# Patient Record
Sex: MALE | Race: WHITE
[De-identification: names, ages, dates, MRNs, and addresses within clinical notes are randomized per-mention and may not be internally consistent; named-entity substitution may affect disease eponyms.]

---

## 2020-07-20 ENCOUNTER — APPOINTMENT (OUTPATIENT)
Dept: CARDIOLOGY | Facility: CLINIC | Age: 65
End: 2020-07-20

## 2021-02-01 ENCOUNTER — RX RENEWAL (OUTPATIENT)
Age: 66
End: 2021-02-01

## 2021-02-01 DIAGNOSIS — I10 ESSENTIAL (PRIMARY) HYPERTENSION: ICD-10-CM

## 2021-02-01 DIAGNOSIS — F32.9 MAJOR DEPRESSIVE DISORDER, SINGLE EPISODE, UNSPECIFIED: ICD-10-CM

## 2021-02-01 PROBLEM — Z00.00 ENCOUNTER FOR PREVENTIVE HEALTH EXAMINATION: Status: ACTIVE | Noted: 2021-02-01

## 2021-03-08 DIAGNOSIS — J32.9 CHRONIC SINUSITIS, UNSPECIFIED: ICD-10-CM

## 2021-05-28 ENCOUNTER — APPOINTMENT (OUTPATIENT)
Dept: UROLOGY | Facility: CLINIC | Age: 66
End: 2021-05-28
Payer: COMMERCIAL

## 2021-05-28 DIAGNOSIS — Z12.5 ENCOUNTER FOR SCREENING FOR MALIGNANT NEOPLASM OF PROSTATE: ICD-10-CM

## 2021-05-28 PROCEDURE — 99204 OFFICE O/P NEW MOD 45 MIN: CPT

## 2021-05-28 PROCEDURE — 99072 ADDL SUPL MATRL&STAF TM PHE: CPT

## 2021-05-28 NOTE — HISTORY OF PRESENT ILLNESS
[FreeTextEntry1] : This is a 65 year male who has nocturia every 4 hours\par takes over the counter -- for 6 months didn’t see much difference\par \par was told has enlarged prostate 2 years ago during a back MRI\par \par Some ED -- thinks after started lexapor\par no histotyr of heart attack\par able to walk 2 miles without chest pain

## 2021-07-21 ENCOUNTER — APPOINTMENT (OUTPATIENT)
Dept: UROLOGY | Facility: CLINIC | Age: 66
End: 2021-07-21
Payer: COMMERCIAL

## 2021-07-21 VITALS — WEIGHT: 210 LBS | HEIGHT: 70 IN | BODY MASS INDEX: 30.06 KG/M2

## 2021-07-21 DIAGNOSIS — N52.01 ERECTILE DYSFUNCTION DUE TO ARTERIAL INSUFFICIENCY: ICD-10-CM

## 2021-07-21 DIAGNOSIS — N40.1 BENIGN PROSTATIC HYPERPLASIA WITH LOWER URINARY TRACT SYMPMS: ICD-10-CM

## 2021-07-21 DIAGNOSIS — N13.8 BENIGN PROSTATIC HYPERPLASIA WITH LOWER URINARY TRACT SYMPMS: ICD-10-CM

## 2021-07-21 PROCEDURE — 99213 OFFICE O/P EST LOW 20 MIN: CPT

## 2021-07-21 PROCEDURE — 99072 ADDL SUPL MATRL&STAF TM PHE: CPT

## 2021-07-21 NOTE — ASSESSMENT
[FreeTextEntry1] : This is a 65 year male who has nocturia every 4 hours\par takes over the counter -- for 6 months didn’t see much difference\par has been taking flomax since last visit\par \par was told has enlarged prostate 2 years ago during a back MRI\par \par Some ED -- thinks after started lexapor\par no histotyr of heart attack\par able to walk 2 miles without chest pain. \par wishes to not treat ED at this time+\par \par  june 2021\par Culture - Urine; no grwoth \par Urinalysis w/Reflex- negative\par US Urinary Bladder; prostate 93g with median lobe hypertrophy protruding into bladder, pvr of 235 first void then 188ml\par PSA Profile - Total = 6, % free = 20\par \par July 2021\par uroflow/pvr\par voided 101ml, peak flow of 8.8ml/s and pvr of 161ml

## 2021-07-21 NOTE — HISTORY OF PRESENT ILLNESS
[FreeTextEntry1] : This is a 65 year male who has nocturia every 4 hours\par takes over the counter -- for 6 months didn’t see much difference\par has been taking flomax since last visit\par \par was told has enlarged prostate 2 years ago during a back MRI\par \par Some ED -- thinks after started lexapor\par no histotyr of heart attack\par able to walk 2 miles without chest pain. \par \par  june 2021\par Culture - Urine; no grwoth \par Urinalysis w/Reflex- negative\par US Urinary Bladder; prostate 93g with median lobe hypertrophy protruding into bladder, pvr of 235 first void then 188ml\par PSA Profile - Total = 6, % free = 20\par \par July 2021\par uroflow/pvr\par voided 101ml, peak flow of 8.8ml/s and pvr of 161ml

## 2021-09-14 ENCOUNTER — RESULT REVIEW (OUTPATIENT)
Age: 66
End: 2021-09-14

## 2021-09-14 ENCOUNTER — OUTPATIENT (OUTPATIENT)
Dept: OUTPATIENT SERVICES | Facility: HOSPITAL | Age: 66
LOS: 1 days | Discharge: HOME | End: 2021-09-14
Payer: COMMERCIAL

## 2021-09-14 DIAGNOSIS — R97.20 ELEVATED PROSTATE SPECIFIC ANTIGEN [PSA]: ICD-10-CM

## 2021-09-14 PROCEDURE — 72197 MRI PELVIS W/O & W/DYE: CPT | Mod: 26

## 2021-09-15 ENCOUNTER — APPOINTMENT (OUTPATIENT)
Dept: UROLOGY | Facility: CLINIC | Age: 66
End: 2021-09-15
Payer: COMMERCIAL

## 2021-09-15 VITALS — TEMPERATURE: 98 F | HEIGHT: 70 IN | WEIGHT: 210 LBS | BODY MASS INDEX: 30.06 KG/M2

## 2021-09-15 DIAGNOSIS — R35.1 NOCTURIA: ICD-10-CM

## 2021-09-15 DIAGNOSIS — R97.20 ELEVATED PROSTATE, SPECIFIC ANTIGEN [PSA]: ICD-10-CM

## 2021-09-15 PROCEDURE — 99214 OFFICE O/P EST MOD 30 MIN: CPT

## 2021-09-15 NOTE — HISTORY OF PRESENT ILLNESS
[FreeTextEntry1] : This is a 65 year male who has nocturia every 4 hours\par takes over the counter -- for 6 months didn’t see much difference\par has been taking flomax since last visit\par finasteride added last visit\par \par was told has enlarged prostate 2 years ago during a back MRI\par \par Some ED -- thinks after started lexapor\par no histotyr of heart attack\par able to walk 2 miles without chest pain. \par wishes to not treat ED at this time+\par \par  june 2021\par Culture - Urine; no grwoth \par Urinalysis w/Reflex- negative\par US Urinary Bladder; prostate 93g with median lobe hypertrophy protruding into bladder, pvr of 235 first void then 188ml\par PSA Profile - Total = 6, % free = 20\par \par July 2021\par uroflow/pvr\par voided 101ml, peak flow of 8.8ml/s and pvr of 161ml. \par \par  \par Sept 2021\par MR Prostate w/wo IV Cont; \par pirad 4 lesion in the base of the mid right gland -- multiple nonspecific subcm pelvic nodes

## 2021-09-15 NOTE — ASSESSMENT
[FreeTextEntry1] : This is a 65 year male who has nocturia every 4 hours\par takes over the counter -- for 6 months didn’t see much difference\par has been taking flomax since last visit\par finasteride added last visit\par \par was told has enlarged prostate 2 years ago during a back MRI\par \par Some ED -- thinks after started lexapor\par no histotyr of heart attack\par able to walk 2 miles without chest pain. \par wishes to not treat ED at this time+\par \par  june 2021\par Culture - Urine; no grwoth \par Urinalysis w/Reflex- negative\par US Urinary Bladder; prostate 93g with median lobe hypertrophy protruding into bladder, pvr of 235 first void then 188ml\par PSA Profile - Total = 6, % free = 20\par \par July 2021\par uroflow/pvr\par voided 101ml, peak flow of 8.8ml/s and pvr of 161ml. \par \par  \par Sept 2021\par MR Prostate w/wo IV Cont; \par pirad 4 lesion in the base of the mid right gland -- multiple nonspecific subcm pelvic nodesv

## 2021-10-07 ENCOUNTER — NON-APPOINTMENT (OUTPATIENT)
Age: 66
End: 2021-10-07

## 2022-02-25 ENCOUNTER — RX RENEWAL (OUTPATIENT)
Age: 67
End: 2022-02-25

## 2022-04-11 ENCOUNTER — RX RENEWAL (OUTPATIENT)
Age: 67
End: 2022-04-11

## 2023-03-30 ENCOUNTER — RX RENEWAL (OUTPATIENT)
Age: 68
End: 2023-03-30

## 2023-11-22 ENCOUNTER — RX RENEWAL (OUTPATIENT)
Age: 68
End: 2023-11-22

## 2023-11-22 RX ORDER — ESCITALOPRAM OXALATE 10 MG/1
10 TABLET ORAL
Qty: 90 | Refills: 3 | Status: ACTIVE | COMMUNITY
Start: 2021-12-07 | End: 1900-01-01

## 2023-11-22 RX ORDER — LISINOPRIL 10 MG/1
10 TABLET ORAL
Qty: 90 | Refills: 3 | Status: ACTIVE | COMMUNITY
Start: 2021-12-07 | End: 1900-01-01

## 2024-04-04 ENCOUNTER — NON-APPOINTMENT (OUTPATIENT)
Age: 69
End: 2024-04-04

## 2024-04-05 ENCOUNTER — APPOINTMENT (OUTPATIENT)
Dept: NEUROLOGY | Facility: CLINIC | Age: 69
End: 2024-04-05
Payer: COMMERCIAL

## 2024-04-05 VITALS
WEIGHT: 220 LBS | SYSTOLIC BLOOD PRESSURE: 179 MMHG | HEIGHT: 70 IN | DIASTOLIC BLOOD PRESSURE: 97 MMHG | BODY MASS INDEX: 31.5 KG/M2 | HEART RATE: 79 BPM

## 2024-04-05 DIAGNOSIS — F17.210 NICOTINE DEPENDENCE, CIGARETTES, UNCOMPLICATED: ICD-10-CM

## 2024-04-05 DIAGNOSIS — N40.0 BENIGN PROSTATIC HYPERPLASIA WITHOUT LOWER URINARY TRACT SYMPMS: ICD-10-CM

## 2024-04-05 DIAGNOSIS — Z86.59 PERSONAL HISTORY OF OTHER MENTAL AND BEHAVIORAL DISORDERS: ICD-10-CM

## 2024-04-05 DIAGNOSIS — G51.39 CLONIC HEMIFACIAL SPASM, UNSPECIFIED: ICD-10-CM

## 2024-04-05 DIAGNOSIS — G51.4 FACIAL MYOKYMIA: ICD-10-CM

## 2024-04-05 DIAGNOSIS — Z78.9 OTHER SPECIFIED HEALTH STATUS: ICD-10-CM

## 2024-04-05 PROCEDURE — 99204 OFFICE O/P NEW MOD 45 MIN: CPT

## 2024-04-05 PROCEDURE — G2211 COMPLEX E/M VISIT ADD ON: CPT

## 2024-04-05 RX ORDER — LISINOPRIL 10 MG/1
10 TABLET ORAL
Qty: 90 | Refills: 3 | Status: DISCONTINUED | COMMUNITY
Start: 2021-02-01 | End: 2024-04-05

## 2024-04-05 RX ORDER — AZITHROMYCIN 250 MG/1
250 TABLET, FILM COATED ORAL
Qty: 1 | Refills: 1 | Status: DISCONTINUED | COMMUNITY
Start: 2023-05-09 | End: 2024-04-05

## 2024-04-05 RX ORDER — AZITHROMYCIN 250 MG/1
250 TABLET, FILM COATED ORAL
Qty: 1 | Refills: 1 | Status: DISCONTINUED | COMMUNITY
Start: 2021-12-23 | End: 2024-04-05

## 2024-04-05 RX ORDER — ESCITALOPRAM OXALATE 10 MG/1
10 TABLET ORAL
Qty: 90 | Refills: 3 | Status: DISCONTINUED | COMMUNITY
Start: 2021-02-01 | End: 2024-04-05

## 2024-04-05 RX ORDER — DIAZEPAM 2 MG/1
2 TABLET ORAL AS DIRECTED
Qty: 14 | Refills: 0 | Status: ACTIVE | COMMUNITY
Start: 2024-04-05 | End: 1900-01-01

## 2024-04-05 RX ORDER — FINASTERIDE 5 MG/1
5 TABLET, FILM COATED ORAL DAILY
Qty: 90 | Refills: 3 | Status: DISCONTINUED | COMMUNITY
Start: 2021-07-21 | End: 2024-04-05

## 2024-04-05 RX ORDER — AZITHROMYCIN 250 MG/1
250 TABLET, FILM COATED ORAL
Qty: 1 | Refills: 0 | Status: DISCONTINUED | COMMUNITY
Start: 2022-11-11 | End: 2024-04-05

## 2024-04-05 RX ORDER — TAMSULOSIN HYDROCHLORIDE 0.4 MG/1
0.4 CAPSULE ORAL
Qty: 90 | Refills: 3 | Status: DISCONTINUED | COMMUNITY
Start: 2021-05-28 | End: 2024-04-05

## 2024-04-05 RX ORDER — AMOXICILLIN AND CLAVULANATE POTASSIUM 875; 125 MG/1; MG/1
875-125 TABLET, COATED ORAL
Qty: 20 | Refills: 0 | Status: DISCONTINUED | COMMUNITY
Start: 2021-03-08 | End: 2024-04-05

## 2024-04-08 NOTE — REVIEW OF SYSTEMS
[Depression] : depression [As Noted in HPI] : as noted in HPI [Negative] : Heme/Lymph [de-identified] : admits to stress

## 2024-04-08 NOTE — ASSESSMENT
[FreeTextEntry1] : Mr. Alexander Lake is a 68-year-old male with a pmhx of elevated PSA, enlarged prostate, HTN, depression presents to the neurology office today with hemifacial spasms that started 6 months and has worsened becoming constant over the last couple of weeks.  Due to the patient's hx of enlarged prostate, elevated PSA, and patient not being compliant with prostate tx, the differential is still broad. The differentials include: lesions to the skull and brain. May also be due to vascular lesion on trigeminal nerve, could also include idiopathic and psychogenic causes of the facial twitching.  Plan: MR head w& w/o Iv contrast (attention to cranial nerve exits specifically trigeminal, facial nerves) Serology ordered.  Referral to PMD for routine primary care management  F.U in clinic once MRI results are in

## 2024-04-08 NOTE — PHYSICAL EXAM
[FreeTextEntry1] :  Mental status: Awake, alert and oriented x3.  Recent and remote memory intact.  Naming, repetition and comprehension intact.  Attention/concentration intact.  No dysarthria, no aphasia.  Fund of knowledge appropriate.  Cranial nerves: Pupils equally round and reactive to light, visual fields full, no nystagmus, extraocular muscles intact, V1, V2 decreased temperature R V1-V2 distribution, OTHERWISE, face symmetric, hearing intact to finger rub, palate elevation symmetric, tongue was midline.  Motor:  MRC grading 5/5 b/l UE/LE.   strength 5/5.  Normal tone and bulk.  No abnormal movements.  Sensation: Intact to light touch, proprioception, and pinprick.  Coordination: No dysmetria on finger-to-nose and heel-to-shin.  No dysdiadokinesia.  Reflexes: 2+ in bilateral UE/LE, downgoing toes bilaterally. (-) Samaniego.  Gait: Narrow and steady. No ataxia.  Romberg negative  Episodes of right hemifacial spasms occurring every 2-3 seconds which can be suppressed to some degree when patient distracted.

## 2024-04-08 NOTE — HISTORY OF PRESENT ILLNESS
[FreeTextEntry1] : Mr. Alexander Lake is an 68-year-old male with a pmhx of elevated PSA, enlarged prostate, HTN, depression presents to the neurology office today as a new patient with a compliant of right eye/ right facial twitching. Patient states this started happening 6 months ago and over the last couple weeks it is getting worse to the point where it is constant.  Patient has not seen a PMD in many years and has not had any lab work drawn since 2021. Unknown if twitching occurs while asleep. No pain associated with twitching No difficulty swallowing solids r liquids No weakness in extremities No diplopia No problems or changes in urinary or bowel habits No change in gait

## 2024-04-17 LAB
APTT BLD: 31.7 SEC
HCT VFR BLD CALC: 45.8 %
HGB BLD-MCNC: 15.3 G/DL
INR PPP: 1.07 RATIO
MCHC RBC-ENTMCNC: 31.7 PG
MCHC RBC-ENTMCNC: 33.4 G/DL
MCV RBC AUTO: 94.8 FL
PLATELET # BLD AUTO: 224 K/UL
PMV BLD AUTO: 0 /100 WBCS
PMV BLD: 10.8 FL
PT BLD: 12.2 SEC
RBC # BLD: 4.83 M/UL
RBC # FLD: 13.2 %
WBC # FLD AUTO: 9.41 K/UL

## 2024-04-18 LAB
25(OH)D3 SERPL-MCNC: 25 NG/ML
ALBUMIN SERPL ELPH-MCNC: 4.2 G/DL
ALP BLD-CCNC: 83 U/L
ALT SERPL-CCNC: 18 U/L
ANION GAP SERPL CALC-SCNC: 11 MMOL/L
AST SERPL-CCNC: 15 U/L
BILIRUB SERPL-MCNC: 0.4 MG/DL
BUN SERPL-MCNC: 14 MG/DL
CALCIUM SERPL-MCNC: 9.1 MG/DL
CHLORIDE SERPL-SCNC: 105 MMOL/L
CHOLEST SERPL-MCNC: 214 MG/DL
CO2 SERPL-SCNC: 26 MMOL/L
CREAT SERPL-MCNC: 0.8 MG/DL
EGFR: 96 ML/MIN/1.73M2
ENA SS-A AB SER IA-ACNC: <0.2 AL
ENA SS-B AB SER IA-ACNC: <0.2 AL
ERYTHROCYTE [SEDIMENTATION RATE] IN BLOOD BY WESTERGREN METHOD: 15 MM/HR
ESTIMATED AVERAGE GLUCOSE: 111 MG/DL
FOLATE SERPL-MCNC: 12.4 NG/ML
GLUCOSE SERPL-MCNC: 104 MG/DL
HBA1C MFR BLD HPLC: 5.5 %
HDLC SERPL-MCNC: 45 MG/DL
LDLC SERPL CALC-MCNC: 143 MG/DL
NONHDLC SERPL-MCNC: 169 MG/DL
POTASSIUM SERPL-SCNC: 4.4 MMOL/L
PROT SERPL-MCNC: 7.2 G/DL
PSA SERPL-MCNC: 7.75 NG/ML
SODIUM SERPL-SCNC: 142 MMOL/L
T4 SERPL-MCNC: 6.1 UG/DL
TRIGL SERPL-MCNC: 131 MG/DL
TSH SERPL-ACNC: 2.59 UIU/ML
VIT B12 SERPL-MCNC: 377 PG/ML

## 2024-04-22 LAB
ANA PAT FLD IF-IMP: ABNORMAL
ANA SER IF-ACNC: ABNORMAL

## 2024-04-29 ENCOUNTER — RESULT REVIEW (OUTPATIENT)
Age: 69
End: 2024-04-29

## 2024-04-29 ENCOUNTER — TRANSCRIPTION ENCOUNTER (OUTPATIENT)
Age: 69
End: 2024-04-29

## 2024-04-29 ENCOUNTER — OUTPATIENT (OUTPATIENT)
Dept: OUTPATIENT SERVICES | Facility: HOSPITAL | Age: 69
LOS: 1 days | End: 2024-04-29
Payer: COMMERCIAL

## 2024-04-29 DIAGNOSIS — R51.9 HEADACHE, UNSPECIFIED: ICD-10-CM

## 2024-04-29 DIAGNOSIS — G44.211 EPISODIC TENSION-TYPE HEADACHE, INTRACTABLE: ICD-10-CM

## 2024-04-29 DIAGNOSIS — Z00.8 ENCOUNTER FOR OTHER GENERAL EXAMINATION: ICD-10-CM

## 2024-04-29 DIAGNOSIS — G51.33 CLONIC HEMIFACIAL SPASM, BILATERAL: ICD-10-CM

## 2024-04-29 PROCEDURE — 71046 X-RAY EXAM CHEST 2 VIEWS: CPT | Mod: 26

## 2024-04-29 PROCEDURE — A9579: CPT

## 2024-04-29 PROCEDURE — 70553 MRI BRAIN STEM W/O & W/DYE: CPT

## 2024-04-29 PROCEDURE — 71046 X-RAY EXAM CHEST 2 VIEWS: CPT

## 2024-04-29 PROCEDURE — 70553 MRI BRAIN STEM W/O & W/DYE: CPT | Mod: 26

## 2024-04-30 ENCOUNTER — RX RENEWAL (OUTPATIENT)
Age: 69
End: 2024-04-30

## 2024-04-30 DIAGNOSIS — R51.9 HEADACHE, UNSPECIFIED: ICD-10-CM

## 2024-04-30 RX ORDER — CARBAMAZEPINE 200 MG/1
200 TABLET ORAL TWICE DAILY
Qty: 180 | Refills: 0 | Status: ACTIVE | COMMUNITY
Start: 2024-04-30 | End: 1900-01-01

## 2024-10-24 ENCOUNTER — APPOINTMENT (OUTPATIENT)
Dept: NEUROLOGY | Facility: CLINIC | Age: 69
End: 2024-10-24
Payer: SELF-PAY

## 2024-10-24 VITALS
WEIGHT: 255 LBS | BODY MASS INDEX: 36.51 KG/M2 | HEIGHT: 70 IN | SYSTOLIC BLOOD PRESSURE: 142 MMHG | DIASTOLIC BLOOD PRESSURE: 89 MMHG | HEART RATE: 76 BPM

## 2024-10-24 PROCEDURE — 95874 GUIDE NERV DESTR NEEDLE EMG: CPT

## 2024-10-24 PROCEDURE — 99204 OFFICE O/P NEW MOD 45 MIN: CPT | Mod: 25

## 2024-10-24 PROCEDURE — 64612 DESTROY NERVE FACE MUSCLE: CPT

## 2024-12-28 RX ORDER — AZITHROMYCIN 250 MG/1
250 TABLET, FILM COATED ORAL
Qty: 1 | Refills: 1 | Status: ACTIVE | COMMUNITY
Start: 2024-12-28 | End: 1900-01-01

## 2025-01-17 ENCOUNTER — APPOINTMENT (OUTPATIENT)
Dept: NEUROLOGY | Facility: CLINIC | Age: 70
End: 2025-01-17

## 2025-03-12 RX ORDER — AZITHROMYCIN 250 MG/1
250 TABLET, FILM COATED ORAL
Qty: 1 | Refills: 1 | Status: ACTIVE | COMMUNITY
Start: 2025-03-12 | End: 1900-01-01

## 2025-03-24 ENCOUNTER — APPOINTMENT (OUTPATIENT)
Dept: NEUROLOGY | Facility: CLINIC | Age: 70
End: 2025-03-24
Payer: MEDICARE

## 2025-03-24 PROCEDURE — 99202 OFFICE O/P NEW SF 15 MIN: CPT | Mod: 25

## 2025-03-24 PROCEDURE — 64612 DESTROY NERVE FACE MUSCLE: CPT | Mod: RT

## 2025-03-24 PROCEDURE — 95874 GUIDE NERV DESTR NEEDLE EMG: CPT

## 2025-07-21 ENCOUNTER — APPOINTMENT (OUTPATIENT)
Dept: NEUROLOGY | Facility: CLINIC | Age: 70
End: 2025-07-21
Payer: MEDICARE

## 2025-07-21 VITALS
DIASTOLIC BLOOD PRESSURE: 79 MMHG | OXYGEN SATURATION: 95 % | SYSTOLIC BLOOD PRESSURE: 137 MMHG | HEIGHT: 70 IN | HEART RATE: 62 BPM | WEIGHT: 228 LBS | BODY MASS INDEX: 32.64 KG/M2

## 2025-07-21 PROCEDURE — 95874 GUIDE NERV DESTR NEEDLE EMG: CPT

## 2025-07-21 PROCEDURE — 64612 DESTROY NERVE FACE MUSCLE: CPT | Mod: RT

## 2025-07-21 PROCEDURE — 99212 OFFICE O/P EST SF 10 MIN: CPT | Mod: 25

## 2025-07-21 RX ORDER — ONABOTULINUMTOXINA 100 [USP'U]/1
100 INJECTION, POWDER, LYOPHILIZED, FOR SOLUTION INTRADERMAL; INTRAMUSCULAR
Refills: 0 | Status: ACTIVE | COMMUNITY

## 2025-07-21 RX ADMIN — ONABOTULINUMTOXINA 0.25 UNIT: 100 INJECTION, POWDER, LYOPHILIZED, FOR SOLUTION INTRADERMAL; INTRAMUSCULAR at 00:00
